# Patient Record
Sex: MALE | Race: WHITE | NOT HISPANIC OR LATINO | URBAN - METROPOLITAN AREA
[De-identification: names, ages, dates, MRNs, and addresses within clinical notes are randomized per-mention and may not be internally consistent; named-entity substitution may affect disease eponyms.]

---

## 2018-01-15 ENCOUNTER — HISTORICAL (OUTPATIENT)
Dept: LAB | Facility: HOSPITAL | Age: 48
End: 2018-01-15

## 2018-01-15 LAB
FERRITIN SERPL-MCNC: 273.7 NG/ML (ref 8–388)
IRON SATN MFR SERPL: 38.9 % (ref 20–50)
IRON SERPL-MCNC: 162 MCG/DL (ref 50–175)
TIBC SERPL-MCNC: 416 MCG/DL (ref 250–450)
TRANSFERRIN SERPL-MCNC: 315 MG/DL (ref 200–360)

## 2018-12-17 ENCOUNTER — HISTORICAL (OUTPATIENT)
Dept: ADMINISTRATIVE | Facility: HOSPITAL | Age: 48
End: 2018-12-17

## 2021-02-08 ENCOUNTER — HISTORICAL (OUTPATIENT)
Dept: ADMINISTRATIVE | Facility: HOSPITAL | Age: 51
End: 2021-02-08

## 2021-02-08 LAB
ALBUMIN SERPL-MCNC: 4.4 GM/DL (ref 3.4–5)
ALBUMIN/GLOB SERPL: 1.63 {RATIO} (ref 1.5–2.5)
ALP SERPL-CCNC: 82 UNIT/L (ref 38–126)
ALT SERPL-CCNC: 62 UNIT/L (ref 7–52)
AST SERPL-CCNC: 38 UNIT/L (ref 15–37)
BILIRUB SERPL-MCNC: 0.7 MG/DL (ref 0.2–1)
BILIRUBIN DIRECT+TOT PNL SERPL-MCNC: 0.1 MG/DL (ref 0–0.5)
BILIRUBIN DIRECT+TOT PNL SERPL-MCNC: 0.6 MG/DL
BUN SERPL-MCNC: 17 MG/DL (ref 7–18)
CALCIUM SERPL-MCNC: 9.4 MG/DL (ref 8.5–10.1)
CHLORIDE SERPL-SCNC: 106 MMOL/L (ref 98–107)
CHOLEST SERPL-MCNC: 214 MG/DL (ref 0–200)
CHOLEST/HDLC SERPL: 5.4 {RATIO}
CO2 SERPL-SCNC: 23 MMOL/L (ref 21–32)
CREAT SERPL-MCNC: 1 MG/DL (ref 0.6–1.3)
GLOBULIN SER-MCNC: 2.7 GM/DL (ref 1.2–3)
GLUCOSE SERPL-MCNC: 108 MG/DL (ref 74–106)
HDLC SERPL-MCNC: 40 MG/DL (ref 35–60)
LDLC SERPL CALC-MCNC: 126 MG/DL (ref 0–129)
POTASSIUM SERPL-SCNC: 4.7 MMOL/L (ref 3.5–5.1)
PROT SERPL-MCNC: 7.1 GM/DL (ref 6.4–8.2)
PSA SERPL-MCNC: 0.94 NG/ML (ref 0–3.5)
SODIUM SERPL-SCNC: 138 MMOL/L (ref 136–145)
TRIGL SERPL-MCNC: 345 MG/DL (ref 30–150)
VLDLC SERPL CALC-MCNC: 69 MG/DL

## 2021-08-18 ENCOUNTER — HISTORICAL (OUTPATIENT)
Dept: ADMINISTRATIVE | Facility: HOSPITAL | Age: 51
End: 2021-08-18

## 2021-08-18 LAB
ALBUMIN SERPL-MCNC: 4.6 GM/DL (ref 3.4–5)
ALBUMIN/GLOB SERPL: 1.7 {RATIO} (ref 1.5–2.5)
ALP SERPL-CCNC: 101 UNIT/L (ref 38–126)
ALT SERPL-CCNC: 67 UNIT/L (ref 7–52)
AST SERPL-CCNC: 44 UNIT/L (ref 15–37)
BILIRUB SERPL-MCNC: 0.7 MG/DL (ref 0.2–1)
BILIRUBIN DIRECT+TOT PNL SERPL-MCNC: 0.2 MG/DL (ref 0–0.5)
BILIRUBIN DIRECT+TOT PNL SERPL-MCNC: 0.5 MG/DL
BUN SERPL-MCNC: 12 MG/DL (ref 7–18)
CALCIUM SERPL-MCNC: 9.7 MG/DL (ref 8.5–10.1)
CHLORIDE SERPL-SCNC: 101 MMOL/L (ref 98–107)
CHOLEST SERPL-MCNC: 185 MG/DL (ref 0–200)
CHOLEST/HDLC SERPL: 4.1 {RATIO}
CO2 SERPL-SCNC: 28 MMOL/L (ref 21–32)
CREAT SERPL-MCNC: 1.1 MG/DL (ref 0.6–1.3)
EST CREAT CLEARANCE SER (OHS): 97.09 ML/MIN
EST. AVERAGE GLUCOSE BLD GHB EST-MCNC: 105 MG/DL
GLOBULIN SER-MCNC: 2.7 GM/DL (ref 1.2–3)
GLUCOSE SERPL-MCNC: 113 MG/DL (ref 74–106)
HBA1C MFR BLD: 5.3 % (ref 4.4–6.4)
HDLC SERPL-MCNC: 45 MG/DL (ref 35–60)
LDLC SERPL CALC-MCNC: 95 MG/DL (ref 0–129)
POTASSIUM SERPL-SCNC: 4.1 MMOL/L (ref 3.5–5.1)
PROT SERPL-MCNC: 7.3 GM/DL (ref 6.4–8.2)
SODIUM SERPL-SCNC: 138 MMOL/L (ref 136–145)
TRIGL SERPL-MCNC: 227 MG/DL (ref 30–150)
VLDLC SERPL CALC-MCNC: 45.4 MG/DL

## 2022-04-09 ENCOUNTER — HISTORICAL (OUTPATIENT)
Dept: ADMINISTRATIVE | Facility: HOSPITAL | Age: 52
End: 2022-04-09

## 2022-04-25 VITALS
HEIGHT: 69 IN | SYSTOLIC BLOOD PRESSURE: 116 MMHG | DIASTOLIC BLOOD PRESSURE: 88 MMHG | BODY MASS INDEX: 28.41 KG/M2 | WEIGHT: 191.81 LBS

## 2022-05-02 NOTE — HISTORICAL [facility]ERNER
This is a historical note converted from [employer]. Formatting and pictures may have been removed.  Please reference Teo for original formatting and attached multimedia. Chief Complaint  6 month recheck  History of Present Illness  50-year-old  male presents office today for 6-month recheck of chronic conditions including?hyperlipidemia, hypertension?nonalcoholic fatty liver disease.? Reports 100% compliance with all prescribed medications. ?Denies any side effects as adverse reaction/intolerance. ?Medications are working well/as intended. ?Patient ?is receiving desired effects. ?Review of his most recent blood work revealed a slightly increased/elevated fasting serum glucose.? Recheck today.? No cardiovascular or pulmonary complaints at this time.  Review of Systems  ?14 point Review of Systems performed with no exceptions for new complaints other than as noted in HPI.  Physical Exam  Vitals & Measurements  HR:?80(Peripheral)? BP:?116/88?  HT:?175.26?cm? WT:?86.4?kg?  Well developed, well nourished, NAD, alert and oriented x4  Vitals reviewed  Head: NCAT  Neck: supple, FROM, no LA, no thyromegaly, no bruits auscultated  CV: RRR no MRG, peripheral pulses intact  Pulmonary: Effort normal and breath sounds normal, no wheeze  Abdomen: soft, NTND, no HSM; ? NABS; no peritoneal signs ? ??  Musculoskeletal: ?no tenderness, joints wnl for acute changes, FROM, no CCE  Skin: warm, dry, intact  Neuro: no motor/sensory deficits, cranial nerves grossly intact, cerebellar function appears intact  Lymphadenopathy: no abnormalities noted  Psychiatric: Cooperative, appropriate mood and affect, appears to have normal judgment  ?  Assessment/Plan  1.?HLD (hyperlipidemia)?E78.5  Well-controlled on current medication regimen. ?Continue as prescribed.? Check fasting lipid panel  Ordered:  Lab Collection Request, 08/18/21 8:27:00 CDT, [facility], 08/18/21 8:27:00 CDT, HLD (hyperlipidemia)  Lipid Panel, Routine collect,  08/18/21 8:31:00 CDT, Blood, Stop date 08/18/21 8:31:00 CDT, Lab Collect, HLD (hyperlipidemia), 08/18/21 8:31:00 CDT  Office/Outpatient Visit Level 3 Established 62112 PC, HLD (hyperlipidemia)  HTN (hypertension)  NAFLD (nonalcoholic fatty liver disease), [facility], 08/18/21 8:27:00 CDT  ?  2.?HTN (hypertension)?I10  Well-controlled on current medication regimen. ?Continue as prescribed.  Ordered:  Office/Outpatient Visit Level 3 Established 39596 PC, HLD (hyperlipidemia)  HTN (hypertension)  NAFLD (nonalcoholic fatty liver disease), [facility], 08/18/21 8:27:00 CDT  ?  3.?NAFLD (nonalcoholic fatty liver disease)?K76.0  ?Recheck CMP  Ordered:  Comprehensive Metabolic Panel, Routine collect, 08/18/21 8:31:00 CDT, Blood, Stop date 08/18/21 8:31:00 CDT, Lab Collect, NAFLD (nonalcoholic fatty liver disease), 08/18/21 8:31:00 CDT  Office/Outpatient Visit Level 3 Established 00153 PC, HLD (hyperlipidemia)  HTN (hypertension)  NAFLD (nonalcoholic fatty liver disease), [facility], 08/18/21 8:27:00 CDT  ?  Orders:  Clinic Follow up, *Est. 02/18/22 3:00:00 CST, Order for future visit, Wellness examination, [facility]  Hemoglobin A1c, Routine collect, 08/18/21 8:31:00 CDT, Blood, Stop date 08/18/21 8:31:00 CDT, Lab Collect, Elevated glucose, 08/18/21 8:31:00 CDT  Return to clinic in 6 months for recheck/wellness exam  Referrals  Clinic Follow up, *Est. 02/18/22 3:00:00 CST, Order for future visit, Wellness examination, [facility]  Clinic Follow up, Order for future visit   Problem List/Past Medical History  Ongoing  Agatston CAC score, <100  Anxiety  HLD (hyperlipidemia)  HTN (hypertension)  Low back pain  Medication management  NAFLD (nonalcoholic fatty liver disease)  Sciatica  Tobacco user  Historical  No qualifying data  Procedure/Surgical History  ankle surgery  coronary artery calcium score  Excision of hernial sac of right inguinal canal   Medications  amLODIPine 10 mg oral tablet, See Instructions,  1 refills  aspirin 81 mg oral tablet, 81 mg= 1 tab(s), Oral, Daily, 3 refills  Crestor 20 mg oral tablet, 20 mg= 1 tab(s), Oral, Daily, 1 refills  fluoxetine 20 mg oral capsule, See Instructions, 1 refills  fluoxetine 20 mg oral capsule, See Instructions  Allergies  No Known Medication Allergies  Social History  Abuse/Neglect  No, 08/18/2021  No, 06/10/2021  Alcohol  Employment/School  Employed, 08/13/2018  Home/Environment  Lives with Spouse. Living situation: Home/Independent., 08/13/2018  Substance Use  Never, 08/13/2018  Tobacco  4 or less cigarettes(less than 1/4 pack)/day in last 30 days, No, 08/18/2021  5-9 cigarettes (between 1/4 to 1/2 pack)/day in last 30 days, No, 06/10/2021  Family History  COPD (chronic obstructive pulmonary disease).: Father.  Immunizations  Vaccine Date Status   pneumococcal 13-valent conjugate vaccine 02/10/2021 Given   tetanus/diphtheria/pertussis, acel(Tdap) 02/10/2021 Given   influenza virus vaccine, inactivated 09/19/2019 Recorded   influenza virus vaccine, inactivated 09/20/2018 Recorded   influenza virus vaccine, inactivated 11/09/2017 Recorded   influenza virus vaccine, inactivated 10/20/2016 Recorded   influenza virus vaccine, inactivated 10/09/2013 Recorded   influenza virus vaccine, inactivated 09/21/2012 Recorded   influenza virus vaccine, inactivated 09/30/2011 Recorded   Health Maintenance  Health Maintenance  ???Pending?(in the next year)  ??? ??OverDue  ??? ? ? ?Aspirin Therapy for CVD Prevention due??08/14/19??and every 1??year(s)  ??? ? ? ?Smoking Cessation due??01/01/21??Variable frequency  ??? ? ? ?Alcohol Misuse Screening due??01/02/21??and every 1??year(s)  ??? ??Due?  ??? ? ? ?ADL Screening due??08/18/21??and every 1??year(s)  ??? ? ? ?Colorectal Screening due??08/18/21??Unknown Frequency  ??? ? ? ?Depression Screening due??08/18/21??Unknown Frequency  ??? ? ? ?Hypertension Management-Education due??08/18/21??and every 1??year(s)  ??? ? ? ?Zoster Vaccine  due??08/18/21??Unknown Frequency  ??? ??Due In Future?  ??? ? ? ?Obesity Screening not due until??01/01/22??and every 1??year(s)  ??? ? ? ?Hypertension Management-BMP not due until??02/08/22??and every 1??year(s)  ??? ? ? ?Body Mass Index Check not due until??06/10/22??and every 1??year(s)  ???Satisfied?(in the past 1 year)  ??? ??Satisfied?  ??? ? ? ?Blood Pressure Screening on??08/18/21.??Satisfied by Minal Trinh MA  ??? ? ? ?Body Mass Index Check on??06/10/21.??Satisfied by Ligia Alvarado LPN  ??? ? ? ?Diabetes Screening on??02/08/21.??Satisfied by Jonny Hammond  ??? ? ? ?Hypertension Management-Blood Pressure on??08/18/21.??Satisfied by Minal Trinh MA  ??? ? ? ?Lipid Screening on??02/08/21.??Satisfied by Jonny Hammond  ??? ? ? ?Obesity Screening on??06/10/21.??Satisfied by Ligia Alvarado LPN  ??? ? ? ?Tetanus Vaccine on??02/10/21.??Satisfied by Ligia Alvarado LPN  ?      Physician?was in the building when patient was?seen and examined by the nurse practitioner.? I concur with the?assessment/plan/management?of the patient.

## 2023-06-15 PROCEDURE — 87389 HIV-1 AG W/HIV-1&-2 AB AG IA: CPT | Performed by: FAMILY MEDICINE

## 2023-06-15 PROCEDURE — 86803 HEPATITIS C AB TEST: CPT | Performed by: FAMILY MEDICINE

## 2024-01-04 PROBLEM — F41.9 ANXIETY: Status: ACTIVE | Noted: 2024-01-04
